# Patient Record
Sex: MALE | Race: WHITE | Employment: UNEMPLOYED | URBAN - NONMETROPOLITAN AREA
[De-identification: names, ages, dates, MRNs, and addresses within clinical notes are randomized per-mention and may not be internally consistent; named-entity substitution may affect disease eponyms.]

---

## 2018-08-14 ENCOUNTER — APPOINTMENT (OUTPATIENT)
Dept: CT IMAGING | Age: 38
End: 2018-08-14
Payer: MEDICAID

## 2018-08-14 ENCOUNTER — HOSPITAL ENCOUNTER (EMERGENCY)
Age: 38
Discharge: HOME | End: 2018-08-14
Attending: EMERGENCY MEDICINE
Payer: MEDICAID

## 2018-08-14 VITALS
HEART RATE: 74 BPM | OXYGEN SATURATION: 97 % | RESPIRATION RATE: 17 BRPM | BODY MASS INDEX: 31.5 KG/M2 | WEIGHT: 220 LBS | SYSTOLIC BLOOD PRESSURE: 107 MMHG | TEMPERATURE: 98.3 F | HEIGHT: 70 IN | DIASTOLIC BLOOD PRESSURE: 80 MMHG

## 2018-08-14 DIAGNOSIS — K52.9 ACUTE COLITIS: Primary | ICD-10-CM

## 2018-08-14 DIAGNOSIS — R74.8 ELEVATED LIVER ENZYMES: ICD-10-CM

## 2018-08-14 LAB
ALBUMIN SERPL-MCNC: 4 G/DL (ref 3.5–5.2)
ALP BLD-CCNC: 120 U/L (ref 40–130)
ALT SERPL-CCNC: 116 U/L (ref 5–41)
ANION GAP SERPL CALCULATED.3IONS-SCNC: 14 MMOL/L (ref 7–19)
AST SERPL-CCNC: 46 U/L (ref 5–40)
BANDED NEUTROPHILS RELATIVE PERCENT: 9 % (ref 0–5)
BASOPHILS ABSOLUTE: 0 K/UL (ref 0–0.2)
BASOPHILS MANUAL: 0 %
BASOPHILS RELATIVE PERCENT: 0 % (ref 0–1)
BILIRUB SERPL-MCNC: 0.5 MG/DL (ref 0.2–1.2)
BILIRUBIN URINE: NEGATIVE
BLOOD, URINE: NEGATIVE
BUN BLDV-MCNC: 18 MG/DL (ref 6–20)
CALCIUM SERPL-MCNC: 9.2 MG/DL (ref 8.6–10)
CHLORIDE BLD-SCNC: 94 MMOL/L (ref 98–111)
CLARITY: CLEAR
CO2: 25 MMOL/L (ref 22–29)
COLOR: YELLOW
CREAT SERPL-MCNC: 1.3 MG/DL (ref 0.5–1.2)
EOSINOPHILS ABSOLUTE: 0 K/UL (ref 0–0.6)
EOSINOPHILS RELATIVE PERCENT: 0 % (ref 0–5)
GFR NON-AFRICAN AMERICAN: >60
GLUCOSE BLD-MCNC: 118 MG/DL (ref 74–109)
GLUCOSE URINE: NEGATIVE MG/DL
HCT VFR BLD CALC: 46 % (ref 42–52)
HEMOGLOBIN: 15.3 G/DL (ref 14–18)
KETONES, URINE: NEGATIVE MG/DL
LEUKOCYTE ESTERASE, URINE: NEGATIVE
LIPASE: 35 U/L (ref 13–60)
LYMPHOCYTES ABSOLUTE: 0.3 K/UL (ref 1.1–4.5)
LYMPHOCYTES RELATIVE PERCENT: 3 % (ref 20–40)
MCH RBC QN AUTO: 29.8 PG (ref 27–31)
MCHC RBC AUTO-ENTMCNC: 33.3 G/DL (ref 33–37)
MCV RBC AUTO: 89.5 FL (ref 80–94)
MONOCYTES ABSOLUTE: 0.7 K/UL (ref 0–0.9)
MONOCYTES RELATIVE PERCENT: 7 % (ref 0–10)
NEUTROPHILS ABSOLUTE: 8.6 K/UL (ref 1.5–7.5)
NEUTROPHILS MANUAL: 81 %
NEUTROPHILS RELATIVE PERCENT: 81 % (ref 50–65)
NITRITE, URINE: NEGATIVE
PDW BLD-RTO: 12.8 % (ref 11.5–14.5)
PH UA: 6
PLATELET # BLD: 220 K/UL (ref 130–400)
PLATELET SLIDE REVIEW: ADEQUATE
PMV BLD AUTO: 9.4 FL (ref 9.4–12.4)
POTASSIUM SERPL-SCNC: 4.1 MMOL/L (ref 3.5–5)
PROTEIN UA: NEGATIVE MG/DL
RBC # BLD: 5.14 M/UL (ref 4.7–6.1)
RBC # BLD: NORMAL 10*6/UL
SODIUM BLD-SCNC: 133 MMOL/L (ref 136–145)
SPECIFIC GRAVITY UA: 1.03
TOTAL PROTEIN: 7.3 G/DL (ref 6.6–8.7)
URINE REFLEX TO CULTURE: NORMAL
UROBILINOGEN, URINE: 0.2 E.U./DL
WBC # BLD: 9.5 K/UL (ref 4.8–10.8)

## 2018-08-14 PROCEDURE — 74177 CT ABD & PELVIS W/CONTRAST: CPT

## 2018-08-14 PROCEDURE — 81003 URINALYSIS AUTO W/O SCOPE: CPT

## 2018-08-14 PROCEDURE — 83690 ASSAY OF LIPASE: CPT

## 2018-08-14 PROCEDURE — 6360000002 HC RX W HCPCS: Performed by: PHYSICIAN ASSISTANT

## 2018-08-14 PROCEDURE — 99284 EMERGENCY DEPT VISIT MOD MDM: CPT | Performed by: PHYSICIAN ASSISTANT

## 2018-08-14 PROCEDURE — 2580000003 HC RX 258: Performed by: PHYSICIAN ASSISTANT

## 2018-08-14 PROCEDURE — 85025 COMPLETE CBC W/AUTO DIFF WBC: CPT

## 2018-08-14 PROCEDURE — 80053 COMPREHEN METABOLIC PANEL: CPT

## 2018-08-14 PROCEDURE — 6360000004 HC RX CONTRAST MEDICATION: Performed by: PHYSICIAN ASSISTANT

## 2018-08-14 PROCEDURE — 99284 EMERGENCY DEPT VISIT MOD MDM: CPT

## 2018-08-14 PROCEDURE — 96375 TX/PRO/DX INJ NEW DRUG ADDON: CPT

## 2018-08-14 PROCEDURE — 96374 THER/PROPH/DIAG INJ IV PUSH: CPT

## 2018-08-14 PROCEDURE — 36415 COLL VENOUS BLD VENIPUNCTURE: CPT

## 2018-08-14 RX ORDER — METRONIDAZOLE 500 MG/1
500 TABLET ORAL 3 TIMES DAILY
Qty: 30 TABLET | Refills: 0 | Status: SHIPPED | OUTPATIENT
Start: 2018-08-14 | End: 2018-08-24

## 2018-08-14 RX ORDER — 0.9 % SODIUM CHLORIDE 0.9 %
1000 INTRAVENOUS SOLUTION INTRAVENOUS ONCE
Status: COMPLETED | OUTPATIENT
Start: 2018-08-14 | End: 2018-08-14

## 2018-08-14 RX ORDER — MORPHINE SULFATE 1 MG/ML
2 INJECTION, SOLUTION EPIDURAL; INTRATHECAL; INTRAVENOUS ONCE
Status: COMPLETED | OUTPATIENT
Start: 2018-08-14 | End: 2018-08-14

## 2018-08-14 RX ORDER — DICYCLOMINE HYDROCHLORIDE 10 MG/1
10 CAPSULE ORAL
Qty: 30 CAPSULE | Refills: 0 | Status: SHIPPED | OUTPATIENT
Start: 2018-08-14

## 2018-08-14 RX ORDER — ONDANSETRON 4 MG/1
4 TABLET, ORALLY DISINTEGRATING ORAL EVERY 8 HOURS PRN
Qty: 20 TABLET | Refills: 0 | Status: SHIPPED | OUTPATIENT
Start: 2018-08-14

## 2018-08-14 RX ORDER — CIPROFLOXACIN 500 MG/1
500 TABLET, FILM COATED ORAL 2 TIMES DAILY
Qty: 20 TABLET | Refills: 0 | Status: SHIPPED | OUTPATIENT
Start: 2018-08-14 | End: 2018-08-24

## 2018-08-14 RX ORDER — ONDANSETRON 2 MG/ML
4 INJECTION INTRAMUSCULAR; INTRAVENOUS ONCE
Status: COMPLETED | OUTPATIENT
Start: 2018-08-14 | End: 2018-08-14

## 2018-08-14 RX ADMIN — Medication 2 MG: at 15:52

## 2018-08-14 RX ADMIN — ONDANSETRON HYDROCHLORIDE 4 MG: 2 INJECTION, SOLUTION INTRAMUSCULAR; INTRAVENOUS at 15:53

## 2018-08-14 RX ADMIN — SODIUM CHLORIDE 1000 ML: 9 INJECTION, SOLUTION INTRAVENOUS at 15:52

## 2018-08-14 RX ADMIN — IOPAMIDOL 90 ML: 755 INJECTION, SOLUTION INTRAVENOUS at 16:22

## 2018-08-14 ASSESSMENT — ENCOUNTER SYMPTOMS
SORE THROAT: 0
ABDOMINAL DISTENTION: 0
COLOR CHANGE: 0
CONSTIPATION: 0
NAUSEA: 1
CHEST TIGHTNESS: 0
ABDOMINAL PAIN: 1
VOMITING: 1
BACK PAIN: 0
WHEEZING: 0
STRIDOR: 0
DIARRHEA: 1
RHINORRHEA: 0
SHORTNESS OF BREATH: 0
COUGH: 0

## 2018-08-14 ASSESSMENT — PAIN SCALES - GENERAL
PAINLEVEL_OUTOF10: 6
PAINLEVEL_OUTOF10: 6

## 2019-07-25 ENCOUNTER — OFFICE VISIT (OUTPATIENT)
Dept: SLEEP MEDICINE | Facility: HOSPITAL | Age: 39
End: 2019-07-25

## 2019-07-25 VITALS
HEART RATE: 88 BPM | HEIGHT: 70 IN | WEIGHT: 234 LBS | DIASTOLIC BLOOD PRESSURE: 91 MMHG | OXYGEN SATURATION: 95 % | BODY MASS INDEX: 33.5 KG/M2 | SYSTOLIC BLOOD PRESSURE: 128 MMHG

## 2019-07-25 DIAGNOSIS — G47.33 OBSTRUCTIVE SLEEP APNEA, ADULT: Primary | ICD-10-CM

## 2019-07-25 PROCEDURE — 99203 OFFICE O/P NEW LOW 30 MIN: CPT | Performed by: NURSE PRACTITIONER

## 2019-07-25 RX ORDER — BUPRENORPHINE HYDROCHLORIDE AND NALOXONE HYDROCHLORIDE DIHYDRATE 8; 2 MG/1; MG/1
TABLET SUBLINGUAL
COMMUNITY
Start: 2019-07-11

## 2019-07-25 RX ORDER — ZOLPIDEM TARTRATE 10 MG/1
TABLET ORAL
COMMUNITY
Start: 2019-07-11 | End: 2019-10-17

## 2019-07-25 NOTE — PROGRESS NOTES
New Patient Sleep Medicine Consultation    Encounter Date: 7/25/2019         Patient's PCP: Noemy Hurd MD  Referring provider: Christ Jim MD  Reason for consultation chief complaint: snoring, loud disruptive snoring, awakening gasping for breath, witnessed apneas, excessive daytime sleepiness, unrefreshing sleep and insomnia    Willi Newby is a 38 y.o. male who admits to snoring, unrestful sleep, gasping during sleep, decreased libido, excessive daytime sleepiness, morning headaches, stop breathing during sleep, irritability, sleeping less than 6 hours per night, Disturbed or restless sleep, choking duing sleep, sleepy driving, night sweats, restless legs at night, difficulty falling asleep, difficulty staying asleep and takes medicine to help go to sleep.   He denies cataplexy, sleep paralysis, or hypnagogic hallucinations. His bedtime is ~ 2463-3863. He  falls asleep after  minutes, and is up 6-8 times per night. He wakes up ~ 3058-2132. He endorses 4 hours of sleep. He drinks 0 cups of coffee, 0 teas, and 4 sodas per day. He drinks 0 alcoholic beverages per week. He is a current smoker. He does not take sedatives or hypnotics. He has some sleepiness with driving. He naps every few days.     [place] - 11    History reviewed. No pertinent past medical history.   +ADHD    Social History     Socioeconomic History   • Marital status: Unknown     Spouse name: Not on file   • Number of children: Not on file   • Years of education: Not on file   • Highest education level: Not on file   Tobacco Use   • Smoking status: Current Every Day Smoker     Packs/day: 1.00     Years: 24.00     Pack years: 24.00   • Smokeless tobacco: Never Used     History reviewed. No pertinent family history.  FH of sleep disorders: possible sister NAMITA on CPAP (?)  Other family history + for: unknown, adopted  Occupation: retired, computer repairs and [employer]  Marital status:   Children: 2  Has 0 brothers  "and 2 sisters  Smoking history: smoked 1 ppds from age 14 until present      Review of Systems:  Constitutional: negative  Eyes: negative  Ears, nose, mouth, throat, and face: negative  Respiratory: negative  Cardiovascular: positive for lower extremity edema  Gastrointestinal: negative  Genitourinary:negative  Integument/breast: negative  Hematologic/lymphatic: negative  Musculoskeletal:positive for back pain  Neurological: negative  Behavioral/Psych: negative  Endocrine: negative  Allergic/Immunologic: negative Patient advised to discuss any positive ROS with PCP.      Vitals:    07/25/19 0818   BP: 128/91   Pulse: 88   SpO2: 95%           07/25/19 0818   Weight: 106 kg (234 lb)       Body mass index is 33.58 kg/m². Patient's Body mass index is 33.58 kg/m². BMI is above normal parameters. Recommendations include: referral to primary care.    Neck circumference: 15\"          General: Alert. Cooperative. Well developed. No acute distress.             Head:  Normocephalic. Symmetrical. Atraumatic.              Eyes: Sclera clear. No icterus. PERRLA. Normal EOM.             Ears: No deformities. Normal hearing.             Nose: No septal deviation. No drainage.          Throat: No oral lesions. No thrush. Moist mucous membranes. Trachea midline    Tongue is normal    Dentition is upper dentures       Pharynx: Posterior pharyngeal pillars are narrow    Mallampati score of I (soft palate, uvula, fauces, and tonsillar pillars visible)    Pharynx is normal with unrermarkable tonsils   Chest Wall:  Normal shape. Symmetric expansion with respiration. No tenderness.          Lungs:  Clear to auscultation bilaterally. No wheezes. No rhonchi. No rales. Respirations regular, even and unlabored.            Heart:  Regular rhythm and normal rate. Normal S1 and S2. No murmur.     Abdomen:  Soft, non-tender and non-distended. Normal bowel sounds. No masses.  Extremities:  Moves all extremities well. No edema.           " Pulses: Pulses palpable and equal bilaterally.               Skin: Dry. Intact. No bleeding. No rash.           Neuro: Moves all 4 extremities and cranial nerves grossly intact.  Psychiatric: Normal mood and affect.      Current Outpatient Medications:   •  buprenorphine-naloxone (SUBOXONE) 8-2 MG per SL tablet, , Disp: , Rfl:   •  zolpidem (AMBIEN) 10 MG tablet, , Disp: , Rfl:     No results found for: WBC, RBC, HGB, HCT, MCV, MCH, MCHC, RDW, RDWSD, MPV, PLT, NEUTRORELPCT, LYMPHORELPCT, MONORELPCT, EOSRELPCT, BASORELPCT, AUTOIGPER, NEUTROABS, LYMPHSABS, MONOSABS, EOSABS, BASOSABS, AUTOIGNUM, NRBC    Contraindications to home sleep test: none    ASSESSMENT:  1. Obstructive sleep apnea New, further workup planned (4)  1. Check home sleep study       Total time spent: 20 min, more than half spent in face to face counseling and coordination of care.        RTC in 3 months         This document has been electronically signed by PATY Castro on July 25, 2019 8:39 AM          CC: Noemy Hurd MD Vincent, William K, MD

## 2019-08-29 ENCOUNTER — HOSPITAL ENCOUNTER (OUTPATIENT)
Dept: SLEEP MEDICINE | Facility: HOSPITAL | Age: 39
Discharge: HOME | End: 2019-08-29
Admitting: NURSE PRACTITIONER

## 2019-08-29 DIAGNOSIS — G47.33 OBSTRUCTIVE SLEEP APNEA, ADULT: ICD-10-CM

## 2019-08-29 PROCEDURE — 95806 SLEEP STUDY UNATT&RESP EFFT: CPT

## 2019-08-29 PROCEDURE — 95806 SLEEP STUDY UNATT&RESP EFFT: CPT | Performed by: INTERNAL MEDICINE

## 2019-09-13 ENCOUNTER — OFFICE VISIT (OUTPATIENT)
Dept: SLEEP MEDICINE | Facility: HOSPITAL | Age: 39
End: 2019-09-13

## 2019-09-13 VITALS
HEIGHT: 70 IN | SYSTOLIC BLOOD PRESSURE: 152 MMHG | BODY MASS INDEX: 32.35 KG/M2 | DIASTOLIC BLOOD PRESSURE: 101 MMHG | OXYGEN SATURATION: 98 % | WEIGHT: 226 LBS | HEART RATE: 96 BPM

## 2019-09-13 DIAGNOSIS — R06.3 CENTRAL SLEEP APNEA DUE TO CHEYNE-STOKES RESPIRATION: Primary | ICD-10-CM

## 2019-09-13 PROCEDURE — 99214 OFFICE O/P EST MOD 30 MIN: CPT | Performed by: NURSE PRACTITIONER

## 2019-09-13 NOTE — PROGRESS NOTES
CHIEF COMPLAINT: follow up sleep testing    LAST OV: 07/25/2019    HPI:    The patient is a 39 y.o. male.  He returns to sleep clinic to discuss the results of the recent home sleep study performed on 08/29/2019.  The AHI/CANDY was 4.7 with Cheyne-Baptiste respirations. The patient did not have any significant cardiac arrhythmias.    INTERVAL MEDICAL HISTORY:   No change since last office visit in regard to the patient's bedtime routine, medications, or diagnosis.    MEDICATIONS:   Current Outpatient Medications:   •  buprenorphine-naloxone (SUBOXONE) 8-2 MG per SL tablet, , Disp: , Rfl:   •  zolpidem (AMBIEN) 10 MG tablet, , Disp: , Rfl:     PHYSICAL EXAM  Vital Signs (last 24 hours)     None            09/13/19  1427   Weight: 103 kg (226 lb)     Gen:  No acute distress, alert, oriented  Lungs:  CTA with normal effort   CV:  RRR, no M/R/G  GI:  soft, non-tender  Ext:  no c/c/e    ASSESSMENT / PLAN:     1. Suspected central sleep apnea with Cheyne-Baptiste respirations New, further workup planned (4)  1. Will check in lab PSG  2. F/U after study for results  2. History of opioid dependence - on Suboxone       The sleep results were discussed in detail with the patient.  The risks of untreated sleep apnea were reviewed.  Treatment options for sleep apnea were discussed. I counseled patient on sleep hygiene, including regular sleep wake schedule and stimulus control therapy, the importance of weight reduction, and abstaining from smoking and alcohol consumption.      All of the patient's questions were answered. He states understanding and agreement with my assessment and plan as above.  Total time 20 min, more than half spent in face to face counseling and coordination of care.    RTC 2 weeks after testing   He agrees to return sooner on a prn basis if sx change.             This document has been electronically signed by PATY Castro on September 13, 2019 2:39 PM    This document has been electronically  signed by PATY Castro on September 13, 2019           CC: Noemy Hurd MD          No ref. provider found

## 2019-09-20 ENCOUNTER — HOSPITAL ENCOUNTER (OUTPATIENT)
Dept: SLEEP MEDICINE | Facility: HOSPITAL | Age: 39
Discharge: HOME | End: 2019-09-20
Admitting: NURSE PRACTITIONER

## 2019-09-20 VITALS — WEIGHT: 227.07 LBS | HEIGHT: 70 IN | BODY MASS INDEX: 32.51 KG/M2

## 2019-09-20 DIAGNOSIS — R06.3 CENTRAL SLEEP APNEA DUE TO CHEYNE-STOKES RESPIRATION: ICD-10-CM

## 2019-09-20 PROCEDURE — 95810 POLYSOM 6/> YRS 4/> PARAM: CPT

## 2019-09-20 PROCEDURE — 95810 POLYSOM 6/> YRS 4/> PARAM: CPT | Performed by: INTERNAL MEDICINE

## 2019-10-02 DIAGNOSIS — G47.33 OBSTRUCTIVE SLEEP APNEA, ADULT: Primary | ICD-10-CM

## 2019-10-17 ENCOUNTER — OFFICE VISIT (OUTPATIENT)
Dept: SLEEP MEDICINE | Facility: HOSPITAL | Age: 39
End: 2019-10-17

## 2019-10-17 VITALS
SYSTOLIC BLOOD PRESSURE: 149 MMHG | OXYGEN SATURATION: 98 % | HEART RATE: 103 BPM | WEIGHT: 229 LBS | BODY MASS INDEX: 32.78 KG/M2 | DIASTOLIC BLOOD PRESSURE: 91 MMHG | HEIGHT: 70 IN

## 2019-10-17 DIAGNOSIS — G47.33 OBSTRUCTIVE SLEEP APNEA, ADULT: Primary | ICD-10-CM

## 2019-10-17 PROCEDURE — 99213 OFFICE O/P EST LOW 20 MIN: CPT | Performed by: NURSE PRACTITIONER

## 2019-10-17 NOTE — PROGRESS NOTES
CHIEF COMPLAINT: follow up sleep testing    LAST OV: 09/13/2019    HPI:    The patient is a 39 y.o. male.  He returns to sleep clinic to discuss the results of the recent diagnostic polysomnography performed on 09/20/2019.  The AHI/CANDY was 15.5, REM AHI 4.8, overall AHI 5.3. The patient had a periodic limb movement index of 9.7.  The patient did not have any significant cardiac arrhythmias.    INTERVAL MEDICAL HISTORY:  No change since last office visit in regard to the patient's bedtime routine, or diagnosis. Stopped Ambien.    MEDICATIONS:   Current Outpatient Medications:   •  buprenorphine-naloxone (SUBOXONE) 8-2 MG per SL tablet, , Disp: , Rfl:     PHYSICAL EXAM  Vital Signs (last 24 hours)       10/16 0700  -  10/17 0659 10/17 0700  -  10/17 1519   Most Recent    Heart Rate     103     103    BP     149/91     149/91    SpO2 (%)     98     98            10/17/19  1518   Weight: 104 kg (229 lb)     Gen:  No acute distress, alert, oriented  Lungs:  CTA with normal effort   CV:  RRR, no M/R/G  GI:  soft, non-tender  Ext:  no c/c/e    Assessment and Plan:    1. Obstructive sleep apnea Established, stable (1 point)  1. Script for autoCPAP 5-20 cm H2O and PAP supplies  2. Return to clinic in 30-90 days       The sleep results were discussed in detail with the patient.  The risks of untreated sleep apnea were reviewed.  Treatment options for sleep apnea were discussed. I counseled patient on sleep hygiene, including regular sleep wake schedule and stimulus control therapy, the importance of weight reduction, and abstaining from smoking and alcohol consumption.    Other treatment options including UPPP surgery, LAUP surgery, radiofrequency somnoplasty, nasal 1-way valves, implantable stimulators, and dental appliances. The patient decided to pursue CPAP therapy.      All of the patient's questions were answered. He states understanding and agreement with my assessment and plan as above.  Total visit time 20 min, with  total of 12 minutes spent face-to-face counseling patient extensively regarding: PAP therapy, PAP compliance, PAP maintenance and Sleep hygiene       Patient to follow up in 31-90 days with compliance report   He agrees to return sooner on a prn basis if sx change.          This document has been electronically signed by PATY Castro on October 17, 2019 3:42 PM            CC: Noemy Hurd MD          No ref. provider found